# Patient Record
Sex: FEMALE | Race: WHITE | URBAN - METROPOLITAN AREA
[De-identification: names, ages, dates, MRNs, and addresses within clinical notes are randomized per-mention and may not be internally consistent; named-entity substitution may affect disease eponyms.]

---

## 2023-01-31 ENCOUNTER — OFFICE VISIT (OUTPATIENT)
Dept: URBAN - METROPOLITAN AREA CLINIC 111 | Facility: CLINIC | Age: 63
End: 2023-01-31
Payer: COMMERCIAL

## 2023-01-31 ENCOUNTER — DASHBOARD ENCOUNTERS (OUTPATIENT)
Age: 63
End: 2023-01-31

## 2023-01-31 ENCOUNTER — WEB ENCOUNTER (OUTPATIENT)
Dept: URBAN - METROPOLITAN AREA CLINIC 111 | Facility: CLINIC | Age: 63
End: 2023-01-31

## 2023-01-31 VITALS
DIASTOLIC BLOOD PRESSURE: 87 MMHG | BODY MASS INDEX: 30.22 KG/M2 | TEMPERATURE: 93.6 F | WEIGHT: 177 LBS | HEIGHT: 64 IN | HEART RATE: 88 BPM | SYSTOLIC BLOOD PRESSURE: 132 MMHG

## 2023-01-31 DIAGNOSIS — R13.10 DYSPHAGIA: ICD-10-CM

## 2023-01-31 DIAGNOSIS — R14.0 BLOATING: ICD-10-CM

## 2023-01-31 PROBLEM — 40739000 DYSPHAGIA: Status: ACTIVE | Noted: 2023-01-31

## 2023-01-31 PROCEDURE — 99204 OFFICE O/P NEW MOD 45 MIN: CPT | Performed by: INTERNAL MEDICINE

## 2023-01-31 RX ORDER — SACCHAROMYCES BOULARDII 250 MG
TAKE 1 CAPSULE BY ORAL ROUTE 2 TIMES A DAY FOR 5 DAYS CAPSULE ORAL 2
Qty: 10 | Refills: 0 | Status: ON HOLD | COMMUNITY
Start: 1900-01-01

## 2023-01-31 RX ORDER — DEXLANSOPRAZOLE 60 MG/1
TAKE 1 CAPSULE (60 MG) BY ORAL ROUTE ONCE DAILY FOR 10 DAYS CAPSULE, DELAYED RELEASE ORAL 1
Qty: 10 | Refills: 0 | Status: ON HOLD | COMMUNITY
Start: 1900-01-01

## 2023-01-31 NOTE — HPI-TODAY'S VISIT:
62F referred by Dr Jaida Martinez for GI eval for bloating and dysphagia . COLON 2016--colitis L colon. bx showed nonspecific resolving self limiting colitis. neg for microscopic colitis. TI w/ bx normal . bloating has been going on for 2 months. BM everyday. no blood. mild lower abd pain. no abd surgery also c/o tighting in the esophagus. pills as well as food gets stuck. going on for 2 months she is very careful when she eats as she is afraid of it getting stuck denied GERD. she took prilosec qd for 2 weeks. it did not help never had egd wt gain 7 lb in past 3 months

## 2023-02-03 ENCOUNTER — OFFICE VISIT (OUTPATIENT)
Dept: URBAN - METROPOLITAN AREA CLINIC 111 | Facility: CLINIC | Age: 63
End: 2023-02-03

## 2023-02-15 ENCOUNTER — TELEPHONE ENCOUNTER (OUTPATIENT)
Dept: URBAN - METROPOLITAN AREA CLINIC 92 | Facility: CLINIC | Age: 63
End: 2023-02-15